# Patient Record
(demographics unavailable — no encounter records)

---

## 2025-05-20 NOTE — HISTORY OF PRESENT ILLNESS
[FreeTextEntry1] : 13-year-old female here for follow-up.  She is here with her mother today who acts as her historian.  Today's exam accomplished with the aid of a .  [place]: [id number]  - Since last being seen she did trial the brace.  It did not provide symptomatic relief.  She states she is still having persistent pain with mechanical pressure as well as wrist flexion.  Her mother is in agreement.  They tried bracing anti-inflammatories.  The cyst has no been present for at least 5 to 6 months.

## 2025-05-20 NOTE — ASSESSMENT
[FreeTextEntry1] : 13-year-old female, volar ganglion cyst - The cyst is very large it is now been present for 5 to 6 months it is not changed in size.  It causes her both pain as well as emotional distress at this time point.  Today's exam accomplished they have a  and her mother is in agreement with below plan.  We again reviewed ganglion cyst.  We discussed how they arise from the joint.  We discussed that they are not inherently dangerous and that observation is a valid continued option.  Given it is causing her pain and the timelines it is unclear whether the cyst will self resolve as they do in pediatric population sometimes however this is large causing her pain and has not had any change in size and consistency.  MRIs confirmed as a cyst - We again discussed volar ganglion cyst are not amenable to aspiration due to the surrounding neurovascular structures - Liliana as well as her mother both state that she would like to have this removed.  We discussed left volar ganglion cyst excision and associated risks of surgery.  We discussed risk of infection, blood loss, damage to nerves blood vessels, tendons and muscle in the area.  We discussed that with ganglion cyst there is a risk of recurrence even with surgical excision.  We discussed sensitivity, scar sensitivity, wrist stiffness, complex regional pain syndrome.  We discussed the bump is traded for a scar.  We discussed the postoperative protocol in detail involving splinting likely 2 to 3 weeks. her mother verbalized risk benefits alternative the procedure, I ensured that using the  all questions were answered to the best of my ability and that there was complete understanding of the procedure and associated risks.  Her mother consented to the procedure.  They would like to proceed in mid June after school is over.

## 2025-05-20 NOTE — PHYSICAL EXAM
[de-identified] : Left wrist and hand -On the volar radial aspect of the wrist there is a loculated volar ganglion cyst -Cyst transilluminates with a penlight -Firm and well-circumscribed not fixed to the skin, it is multiloculated there is 3 distinct lesions - Beau's test demonstrates-patent arch tested with release of both radial and ulnar arteries -Able to make a full composite fist. Denies numbness or tingling. -Negative Tinel's, palpable radial pulse -No numbness or tingling in the hand

## 2025-06-25 NOTE — HISTORY OF PRESENT ILLNESS
[de-identified] : 14yo female presents today for follow up on a left distal fibula fracture. Patient reports she had a fall in school on 5/8/25. She has been in a CAM boot for about 4 weeks. Patient reports overall, she is feeling much better. She has no pain at present. She continues to use her CAM boot.

## 2025-07-17 NOTE — PHYSICAL EXAM
[de-identified] : Left wrist and hand Splint removed, incision examined.  No erythema redness or drainage.  Skin is closed with absorbable Monocryl suture.  No dehiscence of the wound.  Steri-Strips reapplied Cyst no longer present -Able to make a full composite fist. Denies numbness or tingling.  In median, ulnar, radial nerve distribution -Negative Tinel's, palpable radial pulse -No numbness or tingling in the hand

## 2025-07-17 NOTE — HISTORY OF PRESENT ILLNESS
[FreeTextEntry1] : 13 y.o F presents to the office for her 1st POA visit s/p L wrist ganglion cyst excision. She is doing well overall. Denies fevers, sweats, chills.  She is here with her mother today and father.  Parents are spoken speaking only.  Exam, status postoperative.  She has been wearing the splint since the surgery.  She is having no pain.  Denies numbness or tingling.  Has not take any pain medication for several days.

## 2025-07-17 NOTE — ASSESSMENT
[FreeTextEntry1] : - Pathology reviewed, consistent with ganglion cyst.  I discussed with her parents - Wound care: She may begin to shower and let the incision become wet.  The Steri-Strips were applied advised these will fall off.  She should avoid oceans hot tubs pools or soaking the wound for 5-7 more days.  She may slowly advance activity as tolerated.   She has mild loss in wrist extension, she has been splinted for 2 weeks.  We discussed if this persists may require therapy however likely will resolve with using the hand and wrist as tolerated.  70 no pain at this time. - Follow-up in 3 to 4 weeks - We did again discussed that ganglions have a risk of recurrence